# Patient Record
Sex: FEMALE | Race: WHITE | NOT HISPANIC OR LATINO | URBAN - NONMETROPOLITAN AREA
[De-identification: names, ages, dates, MRNs, and addresses within clinical notes are randomized per-mention and may not be internally consistent; named-entity substitution may affect disease eponyms.]

---

## 2019-08-27 ENCOUNTER — IMPORTED ENCOUNTER (OUTPATIENT)
Dept: URBAN - NONMETROPOLITAN AREA CLINIC 1 | Facility: CLINIC | Age: 54
End: 2019-08-27

## 2019-08-27 PROBLEM — S05.01XA: Noted: 2019-08-27

## 2019-08-27 PROCEDURE — 99203 OFFICE O/P NEW LOW 30 MIN: CPT

## 2019-08-27 NOTE — PATIENT DISCUSSION
Conjunctival Abrasion OD-  Discussed findings w/ pt today-  No FB present on exam today-  Patient is allergic to Cipro-  D/c Prolensa-  Start Besivance OD TID x 1 week then stop sample given. -  Monitor PRN changes

## 2022-04-10 ASSESSMENT — VISUAL ACUITY
OD_CC: 20/20-1
OS_CC: 20/20-1

## 2022-04-10 ASSESSMENT — TONOMETRY: OS_IOP_MMHG: 14
